# Patient Record
Sex: FEMALE | Race: WHITE | Employment: UNEMPLOYED | URBAN - METROPOLITAN AREA
[De-identification: names, ages, dates, MRNs, and addresses within clinical notes are randomized per-mention and may not be internally consistent; named-entity substitution may affect disease eponyms.]

---

## 2017-11-20 LAB
ANTIBODY SCREEN, EXTERNAL: NEGATIVE
CHLAMYDIA, EXTERNAL: NEGATIVE
HBSAG, EXTERNAL: NEGATIVE
HIV, EXTERNAL: NEGATIVE
N. GONORRHEA, EXTERNAL: NEGATIVE
RPR, EXTERNAL: NORMAL
RUBELLA, EXTERNAL: NORMAL
TYPE, ABO & RH, EXTERNAL: NORMAL

## 2018-05-29 LAB — GRBS, EXTERNAL: NEGATIVE

## 2018-07-10 ENCOUNTER — HOSPITAL ENCOUNTER (INPATIENT)
Age: 38
LOS: 1 days | Discharge: HOME | End: 2018-07-11
Attending: SPECIALIST | Admitting: OBSTETRICS & GYNECOLOGY
Payer: COMMERCIAL

## 2018-07-10 LAB
ABO + RH BLD: NORMAL
BASOPHILS # BLD: 0 K/UL (ref 0–0.06)
BASOPHILS NFR BLD: 0 % (ref 0–2)
BLOOD GROUP ANTIBODIES SERPL: NORMAL
DIFFERENTIAL METHOD BLD: ABNORMAL
EOSINOPHIL # BLD: 0.1 K/UL (ref 0–0.4)
EOSINOPHIL NFR BLD: 1 % (ref 0–5)
ERYTHROCYTE [DISTWIDTH] IN BLOOD BY AUTOMATED COUNT: 13.1 % (ref 11.6–14.5)
HCT VFR BLD AUTO: 30 % (ref 35–45)
HGB BLD-MCNC: 10 G/DL (ref 12–16)
LYMPHOCYTES # BLD: 1.2 K/UL (ref 0.9–3.6)
LYMPHOCYTES NFR BLD: 24 % (ref 21–52)
MCH RBC QN AUTO: 27.5 PG (ref 24–34)
MCHC RBC AUTO-ENTMCNC: 33.3 G/DL (ref 31–37)
MCV RBC AUTO: 82.6 FL (ref 74–97)
MONOCYTES # BLD: 0.4 K/UL (ref 0.05–1.2)
MONOCYTES NFR BLD: 8 % (ref 3–10)
NEUTS SEG # BLD: 3.5 K/UL (ref 1.8–8)
NEUTS SEG NFR BLD: 67 % (ref 40–73)
PLATELET # BLD AUTO: 169 K/UL (ref 135–420)
PMV BLD AUTO: 11.1 FL (ref 9.2–11.8)
RBC # BLD AUTO: 3.63 M/UL (ref 4.2–5.3)
SPECIMEN EXP DATE BLD: NORMAL
WBC # BLD AUTO: 5.2 K/UL (ref 4.6–13.2)

## 2018-07-10 PROCEDURE — 65270000029 HC RM PRIVATE

## 2018-07-10 PROCEDURE — 75410000002 HC LABOR FEE PER 1 HR

## 2018-07-10 PROCEDURE — 74011250637 HC RX REV CODE- 250/637: Performed by: ADVANCED PRACTICE MIDWIFE

## 2018-07-10 PROCEDURE — 74011250636 HC RX REV CODE- 250/636: Performed by: OBSTETRICS & GYNECOLOGY

## 2018-07-10 PROCEDURE — 75410000000 HC DELIVERY VAGINAL/SINGLE

## 2018-07-10 PROCEDURE — 74011250637 HC RX REV CODE- 250/637: Performed by: OBSTETRICS & GYNECOLOGY

## 2018-07-10 PROCEDURE — 75410000003 HC RECOV DEL/VAG/CSECN EA 0.5 HR

## 2018-07-10 PROCEDURE — 86900 BLOOD TYPING SEROLOGIC ABO: CPT | Performed by: OBSTETRICS & GYNECOLOGY

## 2018-07-10 PROCEDURE — 85025 COMPLETE CBC W/AUTO DIFF WBC: CPT | Performed by: OBSTETRICS & GYNECOLOGY

## 2018-07-10 RX ORDER — FAMOTIDINE 20 MG/1
20 TABLET, FILM COATED ORAL 2 TIMES DAILY
Status: DISCONTINUED | OUTPATIENT
Start: 2018-07-10 | End: 2018-07-11 | Stop reason: HOSPADM

## 2018-07-10 RX ORDER — RANITIDINE HCL 75 MG
75 TABLET ORAL 2 TIMES DAILY
COMMUNITY
End: 2018-07-11

## 2018-07-10 RX ORDER — LIDOCAINE HYDROCHLORIDE 10 MG/ML
20 INJECTION, SOLUTION EPIDURAL; INFILTRATION; INTRACAUDAL; PERINEURAL AS NEEDED
Status: DISCONTINUED | OUTPATIENT
Start: 2018-07-10 | End: 2018-07-10 | Stop reason: HOSPADM

## 2018-07-10 RX ORDER — OXYTOCIN/RINGER'S LACTATE 20/1000 ML
500 PLASTIC BAG, INJECTION (ML) INTRAVENOUS ONCE
Status: COMPLETED | OUTPATIENT
Start: 2018-07-10 | End: 2018-07-10

## 2018-07-10 RX ORDER — PROMETHAZINE HYDROCHLORIDE 25 MG/ML
25 INJECTION, SOLUTION INTRAMUSCULAR; INTRAVENOUS
Status: DISCONTINUED | OUTPATIENT
Start: 2018-07-10 | End: 2018-07-10 | Stop reason: SDUPTHER

## 2018-07-10 RX ORDER — CARBOPROST TROMETHAMINE 250 UG/ML
250 INJECTION, SOLUTION INTRAMUSCULAR
Status: DISCONTINUED | OUTPATIENT
Start: 2018-07-10 | End: 2018-07-10 | Stop reason: HOSPADM

## 2018-07-10 RX ORDER — SODIUM CHLORIDE, SODIUM LACTATE, POTASSIUM CHLORIDE, CALCIUM CHLORIDE 600; 310; 30; 20 MG/100ML; MG/100ML; MG/100ML; MG/100ML
125 INJECTION, SOLUTION INTRAVENOUS CONTINUOUS
Status: DISCONTINUED | OUTPATIENT
Start: 2018-07-10 | End: 2018-07-10 | Stop reason: HOSPADM

## 2018-07-10 RX ORDER — TERBUTALINE SULFATE 1 MG/ML
0.25 INJECTION SUBCUTANEOUS
Status: DISCONTINUED | OUTPATIENT
Start: 2018-07-10 | End: 2018-07-10 | Stop reason: HOSPADM

## 2018-07-10 RX ORDER — SALICYLIC ACID
POWDER (GRAM) MISCELLANEOUS
Status: DISPENSED
Start: 2018-07-10 | End: 2018-07-10

## 2018-07-10 RX ORDER — IBUPROFEN 400 MG/1
800 TABLET ORAL
Status: DISCONTINUED | OUTPATIENT
Start: 2018-07-10 | End: 2018-07-11 | Stop reason: HOSPADM

## 2018-07-10 RX ORDER — IBUPROFEN 400 MG/1
800 TABLET ORAL
Status: DISCONTINUED | OUTPATIENT
Start: 2018-07-10 | End: 2018-07-10 | Stop reason: SDUPTHER

## 2018-07-10 RX ORDER — LANOLIN ALCOHOL/MO/W.PET/CERES
400 CREAM (GRAM) TOPICAL DAILY
COMMUNITY

## 2018-07-10 RX ORDER — MISOPROSTOL 200 UG/1
800 TABLET ORAL
Status: DISCONTINUED | OUTPATIENT
Start: 2018-07-10 | End: 2018-07-10 | Stop reason: HOSPADM

## 2018-07-10 RX ORDER — ZOLPIDEM TARTRATE 5 MG/1
5 TABLET ORAL
Status: DISCONTINUED | OUTPATIENT
Start: 2018-07-10 | End: 2018-07-10 | Stop reason: SDUPTHER

## 2018-07-10 RX ORDER — ZOLPIDEM TARTRATE 5 MG/1
5 TABLET ORAL
Status: DISCONTINUED | OUTPATIENT
Start: 2018-07-10 | End: 2018-07-11 | Stop reason: HOSPADM

## 2018-07-10 RX ORDER — OXYTOCIN/RINGER'S LACTATE 20/1000 ML
125 PLASTIC BAG, INJECTION (ML) INTRAVENOUS CONTINUOUS
Status: DISCONTINUED | OUTPATIENT
Start: 2018-07-10 | End: 2018-07-10 | Stop reason: HOSPADM

## 2018-07-10 RX ORDER — AMOXICILLIN 250 MG
1 CAPSULE ORAL
Status: DISCONTINUED | OUTPATIENT
Start: 2018-07-10 | End: 2018-07-11 | Stop reason: HOSPADM

## 2018-07-10 RX ORDER — OXYTOCIN 10 [USP'U]/ML
10 INJECTION, SOLUTION INTRAMUSCULAR; INTRAVENOUS
Status: DISCONTINUED | OUTPATIENT
Start: 2018-07-10 | End: 2018-07-10 | Stop reason: HOSPADM

## 2018-07-10 RX ORDER — PROMETHAZINE HYDROCHLORIDE 25 MG/ML
25 INJECTION, SOLUTION INTRAMUSCULAR; INTRAVENOUS
Status: DISCONTINUED | OUTPATIENT
Start: 2018-07-10 | End: 2018-07-11 | Stop reason: HOSPADM

## 2018-07-10 RX ORDER — METHYLERGONOVINE MALEATE 0.2 MG/ML
0.2 INJECTION INTRAVENOUS AS NEEDED
Status: DISCONTINUED | OUTPATIENT
Start: 2018-07-10 | End: 2018-07-10 | Stop reason: HOSPADM

## 2018-07-10 RX ORDER — AMOXICILLIN 250 MG
1 CAPSULE ORAL
Status: DISCONTINUED | OUTPATIENT
Start: 2018-07-10 | End: 2018-07-10 | Stop reason: SDUPTHER

## 2018-07-10 RX ORDER — ONDANSETRON 2 MG/ML
4 INJECTION INTRAMUSCULAR; INTRAVENOUS
Status: DISCONTINUED | OUTPATIENT
Start: 2018-07-10 | End: 2018-07-10 | Stop reason: HOSPADM

## 2018-07-10 RX ADMIN — FAMOTIDINE 20 MG: 20 TABLET ORAL at 22:45

## 2018-07-10 RX ADMIN — Medication 10000 MILLI-UNITS/HR: at 09:17

## 2018-07-10 RX ADMIN — IBUPROFEN 800 MG: 400 TABLET ORAL at 10:24

## 2018-07-10 RX ADMIN — Medication 125 ML/HR: at 09:41

## 2018-07-10 RX ADMIN — IBUPROFEN 800 MG: 400 TABLET ORAL at 18:25

## 2018-07-10 NOTE — PROGRESS NOTES
5631 patient arrived to unit ambulatory with c/o ctxs since 0300 today. States about 5 mins apart. Placed on EFM, SVE 7/90/-1 with small amount of bloody show. Dr Brianna Reyes notified.  Admission process started

## 2018-07-10 NOTE — PROGRESS NOTES
Patient gave Carolinasunitha White permission to announce baby's name. Patient expressed gratitude for 's visit.     Spiritual Care Department   Intern  Zacarias Cardona  [phone]

## 2018-07-10 NOTE — PROGRESS NOTES
Mother doing well, up without complaints, voiding without problems. Pain managed well with ibuprofen. Bonding well with baby.

## 2018-07-10 NOTE — H&P
History & Physical    Name: Kamlesh Engle MRN: [id number]  SSN: [id number]    YOB: 1980  Age: 43 y.o. Sex: female        Subjective:     Estimated Date of Delivery: 18  OB History      Para Term  AB Living    1         SAB TAB Ectopic Molar Multiple Live Births                 Anais Dior is a 41yo  at 41.3 weeks in active labor. Prenatal care with Dr Kevan Martínez and uncomplicated pregnancy. AMA and had negative NIPT. Does not know gender of baby. GBS negative. History reviewed. No pertinent past medical history. History reviewed. No pertinent surgical history. Social History     Occupational History    Not on file. Social History Main Topics    Smoking status: Never Smoker    Smokeless tobacco: Never Used    Alcohol use No    Drug use: No    Sexual activity: Not on file     History reviewed. No pertinent family history. Allergies   Allergen Reactions    Percocet [Oxycodone-Acetaminophen] Other (comments)     Vomiting and make her feel like she's on fire     Prior to Admission medications    Medication Sig Start Date End Date Taking? Authorizing Provider   folic acid 165 mcg tablet Take 400 mcg by mouth daily. Yes Historical Provider   raNITIdine (ZANTAC 75) 75 mg tablet Take 75 mg by mouth two (2) times a day. Historical Provider        Review of Systems: A comprehensive review of systems was negative except for that written in the HPI. Objective:     Vitals: There were no vitals filed for this visit. Labs:  No results found for this or any previous visit (from the past 12 hour(s)). Physical Exam:  Heart: RRR  Lungs: Clear  Abdomen: gravid, nontender  Membranes:  Intact  Fetal Heart Rate: Reactive  Cervix 6-7/90/-1    Prenatal Labs:   No results found for: RUBELLAEXT, GRBSEXT, HBSAGEXT, HIVEXT, RPREXT, GONNOEXT, CHLAMEXT      Assessment/Plan:     IUP at 41.3 weeks  Active labor  GBS negative    Anticipate vaginal delivery.  Wants unmedicated birth.    Signed By:  Anderson Barton MD     July 10, 2018

## 2018-07-10 NOTE — PROGRESS NOTES
5553 Patient standing swaying at bedside at time of shift change. Patient denies needs or concerns at the time of change of shift. Patient requested that RN come back in a little while for her morning assessment and VS. FOB and  present and supportive. 9100 SBAR update given to Dr. Dimitri Otero at nurses' station regarding cervical dilation, history, and tracing reviewed on L&D board. Continue current plan of care. 0745 Patient RLR with peanut ball between knees. FOB and  very supportive. Patient and support system deny further concerns or complaints at this time. Ronn reports the CTX as fairly spaced out. TOCO adjusted and rezeroed. 0508 Patient waking in room. 2764 Patient called RN to bedside. Patient reported increased rectal pressure. Cervical exam performed. Cervix soft, posterior and 7-8/90/-1. Patient up to labor in the shower after BP was cycled. Patient and support system deny further concerns, needs or complaints at this time. 200 RN called to bedside. Patient reports a gush of fluid while in the shower. RN unable to visualize quantity and color since patient was showering. Will assess closer with next cervical exam.     9062 With cervical exam. RN could palpate membrane. 0848 SROM clear fluid. Patient grossly ruptured with CTX.     1120 Dr. Dimitri Otero paged    0035 SBAR to 121 Aakash Vilchis, Nursery RN, about patient status. 5446 At bedside with Dr. Dimitri Otero. 8277 Patient complete. Nursery called. Patient did not tolerate check well. Patient encouraged to push by physician. Patient states that she is not ready and that she does not know what to do. Physician left bedside. 0900 Patient frustrated and scared. RN offered support and coached patient with breathing and pushing. Pushing initiated. 0910 Fetal head visible with pushes. RN called out for Dr. Dimitri Otero to come. RN informed that the physician is no longer on the unit. RN asked for Dr. Dimitri Otero to be paged.     3731 RN Called for nursery to come to bedside. RN asked if another provider could be paged for delivery. Nurses at the desk assisting this RN. Patient unable to breathe through CTX and states that she has to push. Natalya Loza RN, from nursery here for transition. 235 [facility] [place], [facility], at bedside. Patient pushing well and coping well. 0504 Dr. Sharona Golden at bedside. 2213 Viable male infant delivered by Benedict Purdy, with Dr. Sharona Golden at bedside. Cord clamping delayed. Infant skin to skin post birth. Dr. Sharona Golden took over for the delivery of the placenta. Cass Guillen, left bedside following delivery. 3176 Livia care completed. 0930 Fundus firm and bleeding stable. 0945 Juice brought to bedside. Patient requested Motrin. 1 RN spoke with pharmacist about deleted duplicate orders. 1045 Moderate bleeding. Fundus dropped from +1 to -1 with massage and bleeding slowed to small following massage. Fundus firm. Bladder starting to fill. Created a plan with the patient to ambulate to the BR when RN is next in the room to attempt to void. 1100 RN at bedside. Fundus firm and bleeding stable. See MAR for timing of stopping IV fluid. IV flushed and locked. Patient up to BR to void. Patient walks with a smooth steady gait. Patient performed own livia care. Clean pads, panties, and ice pack on. Gown changed. 1115 Patient returned to bed. Patient going to feed on the opposite breast. Patient denies further concerns or complaints at this time. Patient reports her pain as a 3-4/10 at this time. Patient states this is an ok pain level for her at this time. 1150 Patient to 3410 and oriented to call bell and safety features in room. Patient denies further questions or concerns a time of transfer.

## 2018-07-10 NOTE — ROUTINE PROCESS
Bedside and Verbal shift change report given to MAHAD Qiu (oncoming nurse) by Gama Arellano RN (offgoing nurse). Report included the following information SBAR, Kardex, Intake/Output, MAR and Recent Results.

## 2018-07-10 NOTE — L&D DELIVERY NOTE
Delivery Summary    Patient: Carmen Hartmann MRN: [id number]  SSN: [id number]    YOB: 1980  Age: 43 y.o.   Sex: female        Labor Events:    Labor: No    Rupture Date: 7/10/2018    Rupture Time: 8:58 AM    Rupture Type SROM    Amniotic Fluid Volume:      Amniotic Fluid Description: Clear  None    Induction:          Augmentation: None    Labor Complications: None     Additional Complications:        Cervical Ripening:       None      Delivery Events:  Episiotomy: None    Laceration(s): None      Repaired: None     Number of Repair Packets:      Suture Type and Size:         Estimated Blood Loss (ml): 200        Information for the patient's :  Ziyad Prater [[id number]]     Delivery Summary - Baby    Delivery Date: 7/10/2018   Delivery Time: 9:17 AM   Delivery Type: Vaginal, Spontaneous Delivery  Sex:  male  Gestational Age: 45w2d  Delivery Clinician:  Hong Michael  Living?: Living   Delivery Location: L&D             APGARS  One minute Five minutes Ten minutes   Skin Color: 0    1       Heart Rate: 2   2         Reflex Irritability: 2   2         Muscle Tone: 2   2       Respiration: 2   2         Total: 8   9           Presentation: Vertex  Position: Right Occiput Anterior  Resuscitation Method:  Tactile Stimulation     Meconium Stained: None    Cord Information:     Complications: None  Cord Blood Sent?:  Yes    Blood Gases Sent?:  No    Placenta:  Date/Time: 7/10  9:21 AM  Removal: Spontaneous      Appearance: Normal      Measurements:  Birth Weight: 4.04 kg    Birth Length: 54.6 cm   Head Circumference: 36 cm     Chest Circumference: 36 cm    Abdominal Girth: 34 cm    Other Providers:   IVY SANCHEZ;RADHA BERNAL LISA D;;;;;;Jamilah GARCIA Obstetrician;Primary Nurse;Primary  Nurse;Nicu Nurse;Neonatologist;Anesthesiologist;Crna;Nurse Practitioner;Midwife;Nursery Nurse           Cord Blood Results:  Information for the patient's : Tracy BOYD [[id number]]   No results found for: Giovany Skains, PCTDIG, BILI, ABORHEXT, [address]    Information for the patient's :  Tracy BOYD [[id number]]   No results found for: APH, APCO2, APO2, AHCO3, ABEC, ABDC, O2ST, SITE, RSCOM, PHI, [place], PO2I, HCO3I, SO2I, IBD     Information for the patient's :  Tracy BOYD [[id number]]   No results found for: EPHV, PCO2V, PO2V, HCO3V, O2STV, EBDV

## 2018-07-10 NOTE — PROGRESS NOTES
Labor progress note:       Here to evaluate labor progress. Estimated Gestational Age: 45w2d     Baseline FHR: Patient Vitals for the past 2 hrs:    Mode Fetal Heart Rate Fetal Activity Variability Decelerations Accelerations RN Reviewed Strip?   07/10/18 0800 External 125 Present 6-25 BPM None Yes Yes   07/10/18 0730 External 135 Present 6-25 BPM None Yes Yes   07/10/18 0710 External 130 - 6-25 BPM None Yes Yes        Previous pelvic exam:      Cervical Exam  Dilation (cm): 10  Eff: 100 %  Station: +1  Position: Anterior  Cervical Consistency: Soft  Vaginal exam done by? : Dr. Denise Leon Status: SROM  Dilation Complete Date: 07/10/18  Dilation Time Complete: 858     Membranes  Membrane Status: SROM  Rupture Identifier: Rupture 1  Rupture Date: 07/10/18  Rupture Time: 848  Odor: None  Amniotic Fluid Description: Clear     Visit Vitals    /88    Pulse 80    Temp 97.8 °F (36.6 °C)    Resp 18    Ht 5' 7\" (1.702 m)    Wt 84.8 kg (187 lb)    Breastfeeding No    BMI 29.29 kg/m2       Temp (24hrs), Av.9 °F (36.6 °C), Min:97.8 °F (36.6 °C), Max:98 °F (36.7 °C)      WBC   Date/Time Value Ref Range Status   07/10/2018 05:58 AM 5.2 4.6 - 13.2 K/uL Final   2010 12:18 PM 4.5 4.5 - 13.0 K/uL Final     HGB   Date/Time Value Ref Range Status   07/10/2018 05:58 AM 10.0 (L) 12.0 - 16.0 g/dL Final   2010 12:18 PM 14.0 12.0 - 16.0 g/dL Final     PLATELET   Date/Time Value Ref Range Status   07/10/2018 05:58  135 - 420 K/uL Final        Plan: continue current care and close monitoring             Josh Braxton MD

## 2018-07-10 NOTE — ROUTINE PROCESS
TRANSFER - IN REPORT:    Verbal report received from Dang RN (name) on Enoch Brownts  being received from Labor and Delivery (unit) for routine progression of care      Report consisted of patients Situation, Background, Assessment and   Recommendations(SBAR). Information from the following report(s) SBAR, Kardex and Intake/Output was reviewed with the receiving nurse. Opportunity for questions and clarification was provided. Assessment completed upon patients arrival to unit and care assumed.

## 2018-07-10 NOTE — IP AVS SNAPSHOT
[address] Patient: Mel Shoemaker MRN: [id number] CIB:[id number] About your hospitalization You were admitted on:  July 10, 2018 You last received care in the:  [place] [address] You were discharged on:  July 11, 2018 Why you were hospitalized Your primary diagnosis was:  Not on File Your diagnoses also included:  Normal Labor And Delivery Follow-up Information Follow up With Details Comments Contact Info Remberto Oliveira, 200 [facility] [address] [place] [address] 
[phone] Discharge Orders None A check kiersten indicates which time of day the medication should be taken. My Medications START taking these medications Instructions Each Dose to Equal  
 Morning Noon Evening Bedtime  
 ibuprofen 800 mg tablet Commonly known as:  MOTRIN Your last dose was: Your next dose is: Take 1 Tab by mouth every eight (8) hours as needed. 800 mg CONTINUE taking these medications Instructions Each Dose to Equal  
 Morning Noon Evening Bedtime  
 folic acid 823 mcg tablet Your last dose was: Your next dose is: Take 400 mcg by mouth daily. 400 mcg STOP taking these medications ZANTAC 75 75 mg tablet Generic drug:  raNITIdine Where to Get Your Medications Information on where to get these meds will be given to you by the nurse or doctor. ! Ask your nurse or doctor about these medications  
  ibuprofen 800 mg tablet Discharge Instructions CONGRATULATIONS ON THE BIRTH OF YOUR BABY! The first six weeks after childbirth is a time of physical and emotional adjustment.   This handout will help to answer questions and provide guidance during the postpartum period. Every family's adjustment is unique, so please call if you have further concerns. At anytime we can be reached at [phone]. During office hours please ask to speak to a charge nurse. After hours, the answering service will take a message and the Nurse-Midwife on-call will return your call. If your question can wait until office hours: Monday-Friday 8:30-4:00, please do so. For emergencies or urgent concerns do not hesitate to call us after hours. DIET Your body is in need of a well-balanced, high protein diet to recuperate from birth. Please continue to take your prenatal vitamins for 6 weeks or as long as you are breastfeeding. Continue to drink at least 6-8 cups of water or other liquid a day. A breastfeeding mother also needs extra protein, calories and calcium containing foods. It is a good rule to drink fluids with every feeding in order to maintain an adequate milk supply and avoid dehydration. Your baby will probably not be bothered by things in your diet, but if the baby seems extremely fussy or develops a rash, you may want to discuss possible food intolerances with your baby's care provider. PAIN MEDICATIONS Acetaminophen (Tylenol), ibuprofen (Motrin), or other prescribed pain medication may be taken as directed to relieve discomfort. The above medications pass in very minimal amounts into the breast milk and usually will not cause problems. There are medications that may affect the baby, so please consult your baby's care provider before taking medication. If you are breastfeeding, be sure to mention this to any care provider you see so that medications that are safe may be selected. There is an excellent resource called AL that is a resource for medication safety in pregnancy and lactation. You can visit their website at [employer]. org/ or call them toll free at [phone] if you have any questions about medication safety. UTERINE INVOLUTION / VAGINAL BLEEDING Involution is the process of the uterus returning to pre-pregnant size. It will take approximately six weeks for this process to occur. To achieve this size your uterus becomes firm to slow bleeding loss from the placental site. The first 7 days after birth, the bleeding is red and heavy. It may change with your activity and position. Some small clots are normal.   After ten days, the bleeding should be pale pink and slowed considerably. The next several weeks may progress to a pink, mucousy discharge. This may continue for 6-8 weeks, depending on your activity. During the first four weeks after delivery we recommend using sanitary pads instead of tampons. Douching should also be avoided, but it is fine to take a tub bath so long as the tub is very clean. ACTIVITY/EXERCISE Adequate rest is essential to recovery. Try to rest or sleep when the baby sleeps. After two weeks, you may begin going for short walks, doing Kegel exercises and abdominal crunches. Avoid heavy, jarring or aerobic exercises. Remember to start out slowly and build up to your previous fitness level. Use common sense and don't overdo as rest is important and the benefits of increased rest are a quicker recovery. For the first two weeks after a  try to limit trips up or down steps. Do not lift anything heavier than the baby during this time. Lifting the baby or other objects should be done by bending at the knees rather than the waist.  Driving should be avoided during the first two to three weeks until you have the strength to push firmly on the brakes in case of an emergency. You may ride as a passenger, but DO wear a seat belt at all times. After a few weeks, you may resume normal activity at whatever pace is comfortable for you. Exercise may also be resumed gradually.   Walking is a good way to start. Finally, try to be reasonable in your expectations. Caring for a new baby after major surgery can be quite trying. Arrange for assistance at home to ensure that you get enough rest.  
 
POSTPARTUM CHECK You may call the office when you return home to set up a postpartum visit. Most patients will be seen at 6 weeks after delivery, but after a  or other circumstances you may be seen in 2 weeks or less. If you are discharged from the hospital with staples that must be removed, you will be asked to come in sooner. At your postpartum visit, a pelvic exam may be performed. If you are having any problems or concerns, please do not hesitate to call. Once again our number is [phone]. MOOD CHANGES Significant hormonal changes occur in the days following delivery, and as a result, many women experience brief episodes of tearfulness or feeling \"blue. \"  These emotional swings may be made worse by lack of sleep and by the adjustments inherent in becoming a mother. For some women, these fluctuations are minor. For others, they are overwhelming; creating feelings of anxiety, depression, or the inability to cope. If you have difficulty functioning as a result of feeling down, or if the mood changes seem severe, do not improve, or result is thoughts of harming yourself or others CALL RIGHT AWAY. PERINEAL CARE The basic goals of perineal care are to prevent infection, to relieve pain and promote healing. Your stitches will dissolve in four to six weeks, and do not need to be removed. After urinating, please continue to clean with warm water from front to back. Please continue sitz baths as instructed twice a day for a week or as needed. Call the office if you see pus in the suture site, or have unusual or severe swelling or pain that seems to be getting worse. INCISION CARE If you had a , clean and dry the incision gently as you would the rest of your body. Washing over the area with soap and water, and showering are fine. If steri-strips are present they will gradually come off with time. Tub baths are permitted. You may experience numbness and burning in the area surrounding the incision which usually resolves gradually over the next several weeks or months. RETURN OF MENSTRUATION Your first menstrual period may occur as soon as four to six weeks after your delivery if you are not breast-feeding. If breast-feeding it is more difficult to predict when your first period will occur. Even if you are not yet menstruating, you may be ovulating and it may be possible to conceive again. It is common for your first period after childbirth to be very heavy with an increased amount of cramping. BREASTS Breast-feeding Mothers: Colostrum is excreted in the first 24-72 hours. Mature breast milk will appear on the 2nd to 5th day. Engorgement may occur with the mature milk making your breasts feel warm and very full. Frequent feedings will make you more comfortable. Babies do not nurse on regular schedules. Nursing every 1 1/2 to 2 hours is normal and frequent feeding DOES NOT mean you are not making enough milk. To avoid nipple confusion, do not give bottles for the first 4 weeks. Growth spurts are common and may require more frequent feedings. This is the way baby increases your milk supply. During a growth spurt, you may feel you are feeding very frequently and that your breasts are \"empty. \"  Don't worry, your milk is produced by supply and demand so this increased frequency of feeding will increase your milk supply within 48 hours. Sore nipples may occur with frequent feedings and are sometimes also caused by improper latch. Check for a proper latch. Baby should have a wide open mouth. Use different positions at each feeding if possible.   Express a small amount of colostrum or breast milk onto the sore area and leave bra flaps unlatched until dry. The lactation consultant at [facility] is available for outpatient consultation without charge. Call [phone] from Monday-Friday 9:00am- 3:00pm to arrange an outpatient appointment with her. Local [facility] and consultants may also be very helpful. If You Are Not Breast-feeding: You will experience swelling, engorgement and some milk production. There are no safe medications available to stop lactation. Some remedies for engorgement include: wearing a tight bra, ice packs and cold green cabbage leaves placed between the breast and your bra. Change these frequently. Tylenol or Motrin should help with the discomfort. SEXUAL ADJUSTMENTS We recommend that you wait at least four weeks before resuming sexual intercourse. A sore perineum, a demanding baby and fatigue will certainly affect your ability to enjoy lovemaking! A vaginal lubricant is recommended to help with any dryness. It is very important to remember that you will ovulate BEFORE your first period and can conceive. If you do not wish another pregnancy right away, please take precautions to avoid pregnancy. If you would like a prescription method of birth control, please discuss this with us at your 6 week visit. ELIMINATION We remind all postpartum patients that it may take a few days for your bowels to return to normal, especially if you had a long labor. For those who had C-sections or severe lacerations, we recommend that you use a stool softener twice daily for at least two weeks. Many stool softeners are over-the-counter. Colace (Docusate Sodium) is recommended. Bulk forming agents such as Metamucil or Fibercon may be used daily in addition to a stool softener to promote regular bowel movements. Eating fresh fruits and vegetables along with whole grains is helpful as well.   Do not be afraid to have a bowel movement as your stitches will not \"come out\" in the course of having a bowel movement. Urination may be difficult due to soreness around the urethra, or as an after effect of epidural.  This is temporary and can be helped  by squirting water over the perineum or try going in the shower. Hemorrhoids are common after birth. Tucks pads, Anusol cream and avoiding constipation are helpful. If constipation does occur, you may take Milk of Magnesia or Senekot according to the package instructions. DANGER SIGNS! CALL WITHOUT DELAY IF YOU ARE EXPERIENCING ANY OF THE FOLLOWING: 
* Unusually heavy bleeding, soaking more than 1 or more pads in an hour. * Vaginal discharge with strong foul odor. * Fever of 101 or higher * Unusual pain or tenderness in the abdominal area. * If breasts are red, hot or have a painful lump. * Depression that persists longer than 1-2 weeks or is severe. * Any urinary frequency accompanied by urgency or pain. * A lump in leg or calf especially if painful, warm or red. We thank you for choosing us for your prenatal care and/or delivery. We wish you all happiness and health with your baby for his or her lifetime! Usha Samuels CNM Patient armband removed and given to patient to take home. Patient was informed of the privacy risks if armband lost or stolen Introducing [facility]! [facility] introduces [employer] patient portal. Now you can access parts of your medical record, email your doctor's office, and request medication refills online. 1. In your internet browser, go to [email or web address] 2. Click on the First Time User? Click Here link in the Sign In box. You will see the New Member Sign Up page. 3. Enter your [employer] Access Code exactly as it appears below. You will not need to use this code after youve completed the sign-up process. If you do not sign up before the expiration date, you must request a new code.  
 
· [employer] Access Code: [email or web address] 
 Expires: 10/9/2018 12:41 PM 
 
4. Enter the last four digits of your Social Security Number (xxxx) and Date of Birth (mm/dd/yyyy) as indicated and click Submit. You will be taken to the next sign-up page. 5. Create a Diamond Mindt ID. This will be your [employer] login ID and cannot be changed, so think of one that is secure and easy to remember. 6. Create a [employer] password. You can change your password at any time. 7. Enter your Password Reset Question and Answer. This can be used at a later time if you forget your password. 8. Enter your e-mail address. You will receive e-mail notification when new information is available in [address] 9. Click Sign Up. You can now view and download portions of your medical record. 10. Click the Download Summary menu link to download a portable copy of your medical information. If you have questions, please visit the Frequently Asked Questions section of the [employer] website. Remember, [employer] is NOT to be used for urgent needs. For medical emergencies, dial 911. Now available from your iPhone and Android! Introducing Lee Holland As a Lydia Martin patient, I wanted to make you aware of our electronic visit tool called Lee Holland. [facility] 24/7 allows you to connect within minutes with a medical provider 24 hours a day, seven days a week via a mobile device or tablet or logging into a secure website from your computer. You can access Lee Holland from anywhere in the [place]. A virtual visit might be right for you when you have a simple condition and feel like you just dont want to get out of bed, or cant get away from work for an appointment, when your regular Lydia Martin provider is not available (evenings, weekends or holidays), or when youre out of town and need minor care.   Electronic visits cost only $49 and if the Lee Holland provider determines a prescription is needed to treat your condition, one can be electronically transmitted to a nearby pharmacy*. Please take a moment to enroll today if you have not already done so. The enrollment process is free and takes just a few minutes. To enroll, please download the [employer] 24/7 demar to your tablet or phone, or visit [email or web address] to enroll on your computer. And, as an [address] [place] [address] patient with a [employer] account, the results of your visits will be scanned into your electronic medical record and your primary care provider will be able to view the scanned results. We urge you to continue to see your regular [employer] provider for your ongoing medical care. And while your primary care provider may not be the one available when you seek a Baileyu virtual visit, the peace of mind you get from getting a real diagnosis real time can be priceless. For more information on Bailey[employer], view our Frequently Asked Questions (FAQs) at [email or web address]. Sincerely, 
 
Rossi Campbell MD 
Chief Medical Officer [address] [place] [address] *:  certain medications cannot be prescribed via Baileyu Providers Seen During Your Hospitalization Provider Specialty Primary office phone Marianela Valle MD Obstetrics & Gynecology [phone] Your Primary Care Physician (PCP) Primary Care Physician Office Phone Office Fax [facility] [place], [address] [place] [address] [phone] You are allergic to the following Allergen Reactions Percocet (Oxycodone-Acetaminophen) Other (comments) Vomiting and make her feel like she's on fire Recent Documentation Height Weight Breastfeeding? BMI OB Status Smoking Status 1.702 m 84.8 kg Unknown 29.29 kg/m2 Recent pregnancy Never Smoker Emergency Contacts Name Discharge Info Relation Home Work Mobile Phillip Fuentes DISCHARGE CAREGIVER [3] Spouse [3] [phone] Patient Belongings The following personal items are in your possession at time of discharge: 
  Dental Appliances: None  Visual Aid: None      Home Medications: None   Jewelry: Earrings  Clothing: Shorts, Shirt, Footwear, At bedside, Undergarments, With patient    Other Valuables: Cell Phone Discharge Instructions Attachments/References DEPRESSION: POSTPARTUM (ENGLISH) BREASTFEEDING (ENGLISH) Patient Handouts Depression After Childbirth: Care Instructions Your Care Instructions Many women get the \"baby blues\" during the first few days after childbirth. You may lose sleep, feel irritable, and cry easily. You may feel happy one minute and sad the next. Hormone changes are one cause of these emotional changes. Also, the demands of a new baby, along with visits from relatives or other family needs, add to a mother's stress. The \"baby blues\" often peak around the fourth day. Then they ease up in less than 2 weeks. If your moodiness or anxiety lasts for more than 2 weeks, or if you feel like life is not worth living, you may have postpartum depression. This is different for each mother. Some mothers with serious depression may worry intensely about their infant's well-being. Others may feel distant from their child. Some mothers might even feel that they might harm their baby. A mother may have signs of paranoia, wondering if someone is watching her. Depression is not a sign of weakness. It is a medical condition that requires treatment. Medicine and counseling often work well to reduce depression. Talk to your doctor about taking antidepressant medicine while breastfeeding. Follow-up care is a key part of your treatment and safety. Be sure to make and go to all appointments, and call your doctor if you are having problems. It's also a good idea to know your test results and keep a list of the medicines you take. How do you know if you are depressed? With all the changes in your life, you may not know if you are depressed. Pregnancy sometimes causes changes in how you feel that are similar to the symptoms of depression. Symptoms of depression include: · Feeling sad or hopeless and losing interest in daily activities. These are the most common symptoms of depression. · Sleeping too much or not enough. · Feeling tired. You may feel as if you have no energy. · Eating too much or too little. · Writing or talking about death, such as writing suicide notes or talking about guns, knives, or pills. Keep the numbers for these national suicide hotlines: 2-124-999-TALK (3-985.961.2697) and 4-228-JCZQDNY (4-230.740.2035). If you or someone you know talks about suicide or feeling hopeless, get help right away. How can you care for yourself at home? · Be safe with medicines. Take your medicines exactly as prescribed. Call your doctor if you think you are having a problem with your medicine. · Eat a healthy diet so that you can keep up your energy. · Get regular daily exercise, such as walks, to help improve your mood. · Get as much sunlight as possible. Keep your shades and curtains open. Get outside as much as you can. · Avoid using alcohol or other substances to feel better. · Get as much rest and sleep as possible. Avoid doing too much. Being too tired can increase depression. · Play stimulating music throughout your day and soothing music at night. · Schedule outings and visits with friends and family. Ask them to call you regularly, so that you do not feel alone. · Ask for help with preparing food and other daily tasks. Family and friends are often happy to help a mother with a . · Be honest with yourself and those who care about you. Tell them about your struggle. · Join a support group of new mothers. No one can better understand the challenges of caring for a  than other new mothers. · If you feel like life is not worth living or are feeling hopeless, get help right away. Keep the numbers for these national suicide hotlines: 8-123-098-TALK (6-442.407.2365) and 6-789-XOEMJJL (1-916.359.5597). When should you call for help? Call 911 anytime you think you may need emergency care. For example, call if: 
  · You feel you cannot stop from hurting yourself, your baby, or someone else.  
[facility] your doctor now or seek immediate medical care if: 
  · You are having trouble caring for yourself or your baby.  
  · You hear voices.  
Michael Jorge closely for changes in your health, and be sure to contact your doctor if: 
  · You have problems with your depression medicine.  
  · You do not get better as expected. Where can you learn more? Go to http://armando-rosalina.info/. Enter D694 in the search box to learn more about \"Depression After Childbirth: Care Instructions. \" Current as of: December 7, 2017 Content Version: 11.7 © 9213-5634 [employer]. Care instructions adapted under license by [employer] (which disclaims liability or warranty for this information). If you have questions about a medical condition or this instruction, always ask your healthcare professional. [address] any warranty or liability for your use of this information. Breastfeeding: Care Instructions Your Care Instructions Breastfeeding has many benefits. It may lower your baby's chances of getting an infection. It also may prevent your baby from having problems such as diabetes and high cholesterol later in life. Breastfeeding also helps you bond with your baby. The American Academy of Pediatrics recommends breastfeeding for at least a year. That may be very hard for many women to do, but breastfeeding even for a shorter period of time is a health benefit to you and your baby.  In the first days after birth, your breasts make a thick, yellow liquid called colostrum. This liquid gives your baby nutrients and antibodies against infection. It is all that babies need in the first days after birth. Your breasts will fill with milk a few days after the birth. Breastfeeding is a skill that gets better with practice. It is common to have some problems. Some women have sore or cracked nipples, blocked milk ducts, or a breast infection (mastitis). You can treat these problems if they happen and continue breastfeeding. Follow-up care is a key part of your treatment and safety. Be sure to make and go to all appointments, and call your doctor if you are having problems. It's also a good idea to know your test results and keep a list of the medicines you take. How can you care for yourself at home? · Breastfeed your baby whenever he or she is hungry. In the first 2 weeks, your baby will feed about every 1 to 3 hours. This will help you keep up your supply of milk. · Put a bed pillow or a nursing pillow on your lap to support your arms and your baby. · Hold your baby in a comfortable position. ¨ You can hold your baby in several ways. One of the most common positions is the cradle hold. One arm supports your baby, with his or her head in the bend of your elbow. Your open hand supports your baby's bottom or back. Your baby's belly lies against yours. ¨ If you had your baby by , or , try the football hold. This position keeps your baby off your belly. Tuck your baby under your arm, with his or her body along the side you will be feeding on. Support your baby's upper body with your arm. With that hand you can control your baby's head to bring his or her mouth to your breast. 
¨ Try different positions with each feeding. If you are having problems, ask for help from your doctor or a lactation consultant.  
· To get your baby to latch on: 
¨ Support and narrow your breast with one hand using a \"U hold,\" with your thumb on the outer side of your breast and your fingers on the inner side. You can also use a \"C hold,\" with all your fingers below the nipple and your thumb above it. Try the different holds to get the deepest latch for whichever breastfeeding position you use. Your other arm is behind your baby's back, with your hand supporting the base of the baby's head. Position your fingers and thumb to point toward your baby's ears. ¨ You can touch your baby's lower lip with your nipple to get your baby to open his or her mouth. Wait until your baby opens up really wide, like a big yawn. Then be sure to bring the baby quickly to your breast-not your breast to the baby. As you bring your baby toward your breast, use your other hand to support the breast and guide it into his or her mouth. ¨ Both the nipple and a large portion of the darker area around the nipple (areola) should be in the baby's mouth. The baby's lips should be flared outward, not folded in (inverted). ¨ Listen for a regular sucking and swallowing pattern while the baby is feeding. If you cannot see or hear a swallowing pattern, watch the baby's ears, which will wiggle slightly when the baby swallows. If the baby's nose appears to be blocked by your breast, tilt the baby's head back slightly, so just the edge of one nostril is clear for breathing. ¨ When your baby is latched, you can usually remove your hand from supporting your breast and bring it under your baby to cradle him or her. Now just relax and breastfeed your baby. · You will know that your baby is feeding well when: 
¨ His or her mouth covers a lot of the areola, and the lips are flared out. ¨ His or her chin and nose rest against your breast. 
¨ Sucking is deep and rhythmic, with short pauses. ¨ You are able to see and hear your baby swallowing. ¨ You do not feel pain in your nipple.  
· If your baby takes only one breast at a feeding, start the next feeding on the other breast. 
 · Anytime you need to remove your baby from the breast, put one finger in the corner of his or her mouth. Push your finger between your baby's gums to gently break the seal. If you do not break the tight seal before you remove your baby, your nipples can become sore, cracked, or bruised. · After feeding your baby, gently pat his or her back to let out any swallowed air. After your baby burps, offer the breast again, or offer the other breast. Sometimes a baby will want to keep feeding after being burped. When should you call for help? Call your doctor now or seek immediate medical care if: 
  · You have symptoms of a breast infection, such as: 
¨ Increased pain, swelling, redness, or warmth around a breast. 
¨ Red streaks extending from the breast. 
¨ Pus draining from a breast. 
¨ A fever.  
  · Your baby has no wet diapers for 6 hours.  
 Watch closely for changes in your health, and be sure to contact your doctor if: 
  · Your baby has trouble latching on to your breast.  
  · You continue to have pain or discomfort when breastfeeding.  
  · You have other questions or concerns. Where can you learn more? Go to http://armando-rosalina.info/. Enter P492 in the search box to learn more about \"Breastfeeding: Care Instructions. \" Current as of: November 21, 2017 Content Version: 11.7 © 3040-6787 [employer]. Care instructions adapted under license by [employer] (which disclaims liability or warranty for this information). If you have questions about a medical condition or this instruction, always ask your healthcare professional. [place] [address] any warranty or liability for your use of this information. Please provide this summary of care documentation to your next provider. Signatures-by signing, you are acknowledging that this After Visit Summary has been reviewed with you and you have received a copy. Patient Signature:  ____________________________________________________________ Date:  ____________________________________________________________  
  
Jessica Shelbie Provider Signature:  ____________________________________________________________ Date:  ____________________________________________________________

## 2018-07-10 NOTE — PROGRESS NOTES
Bedside and Verbal shift change report given to FRANK Sutherland RN, (oncoming nurse) by Farzad Crockett RN, (offgoing nurse). Report included the following information SBAR, Kardex, Procedure Summary, Intake/Output, MAR and Recent Results.     Hui Mcbride RN

## 2018-07-10 NOTE — PROGRESS NOTES
TRANSFER - OUT REPORT:    Verbal report received from FRANK Sutherland RN, (name) on Jefferson Boothe  being received from Jasmina Mitchell RN, (unit) for routine progression of care      Report consisted of patients Situation, Background, Assessment and   Recommendations(SBAR). Information from the following report(s)  was reviewed with the receiving nurse. Opportunity for questions and clarification was provided.       Alejandra Shea RN

## 2018-07-10 NOTE — PROGRESS NOTES
1918 - Bedside shift report w/ Deshawn Rodarte RN. Pt in bed resting. FOB at bedside. Infant in bassinet. Introduced self. No other needs or concerns. Call light w/in reach. 2100 - Pt in bed sleeping. FOB at bedside sleeping. Infant in bassinet. Pt easily awoken and assessment completed. VSS. Pt c/o 1/10 pain in abdomen, but declines any pain intervention at this time. Discussed plan for shift, hourly rounding, pain reporting/management, normal vs abnormal findings, tests/procedures ordered, when to call nurse and pt safety. Questions answered. No other concerns at this time. Call light w/in reach. ID bands matched and infant taken to nursery by this nurse. 2220 - Pt sitting in chair. FOB at bedside. Infant returned to room and ID bands matched. Pt c/o heartburn and requesting medication intervention. No other needs or concerns at this time. 2230 - Spoke to Lane Grady CNM re pt c/o heartburn. Orders given. 2245 - Pepcid given to pt for heartburn. 2320 - Pt states slight decrease in heartburn. Sitting up in chair at the moment because \"it helps. \" No other needs or concerns at this time. Call light w/in reach. 07/11/2018    0400 - Pt sitting in chair at bedside resting. FOB in bed sleeping. Infant in bassinet. Assessment completed. VSS. Pt c/o 2/10 pain that alternates between cramping and heartburn, but declines any pain intervention at this time. No other needs or concerns. Call light w/in reach.

## 2018-07-11 VITALS
BODY MASS INDEX: 29.35 KG/M2 | DIASTOLIC BLOOD PRESSURE: 65 MMHG | WEIGHT: 187 LBS | HEART RATE: 81 BPM | OXYGEN SATURATION: 98 % | HEIGHT: 67 IN | SYSTOLIC BLOOD PRESSURE: 110 MMHG | TEMPERATURE: 97.8 F | RESPIRATION RATE: 16 BRPM

## 2018-07-11 LAB
HCT VFR BLD AUTO: 27 % (ref 35–45)
HGB BLD-MCNC: 8.5 G/DL (ref 12–16)

## 2018-07-11 PROCEDURE — 85018 HEMOGLOBIN: CPT | Performed by: OBSTETRICS & GYNECOLOGY

## 2018-07-11 PROCEDURE — 74011250637 HC RX REV CODE- 250/637: Performed by: ADVANCED PRACTICE MIDWIFE

## 2018-07-11 PROCEDURE — 85014 HEMATOCRIT: CPT | Performed by: OBSTETRICS & GYNECOLOGY

## 2018-07-11 PROCEDURE — 74011250637 HC RX REV CODE- 250/637: Performed by: OBSTETRICS & GYNECOLOGY

## 2018-07-11 PROCEDURE — 36415 COLL VENOUS BLD VENIPUNCTURE: CPT | Performed by: OBSTETRICS & GYNECOLOGY

## 2018-07-11 RX ORDER — IBUPROFEN 800 MG/1
800 TABLET ORAL
Qty: 60 TAB | Refills: 0 | Status: SHIPPED | OUTPATIENT
Start: 2018-07-11

## 2018-07-11 RX ADMIN — FAMOTIDINE 20 MG: 20 TABLET ORAL at 10:04

## 2018-07-11 RX ADMIN — IBUPROFEN 800 MG: 400 TABLET ORAL at 07:45

## 2018-07-11 NOTE — DISCHARGE SUMMARY
Obstetrical Discharge Summary       [facility]  [address] [facility]  [phone]        Patient ID:Mikel [id number],72 y.o.,1980    Postpartum Day: Information for the patient's :  Patriciajuan BOYD [[id number]]   2 days       Admit Date: 7/10/2018    Discharge Date: 2018     Admitting Physician: Branden Valencia MD     Admission Diagnoses: maternity  Normal labor and delivery    Discharge Diagnoses: same as above      Additional Diagnoses:Present on Admission:  **None**       Patient Active Problem List   Diagnosis Code    Normal labor and delivery O80         Procedure:        Baby link  Information for the patient's :  Patricia BOYD [[id number]]     Delivery Type: Vaginal, Spontaneous Delivery   Delivery Date: 7/10/2018   Delivery Time: 9:17 AM     Birth Weight: 4.04 kg     Sex:  male  Delivery Clinician:  Ben Kidd   Gestational Age: Gestational Age: 45w2d    Presentation: Vertex   Position: Right  Occiput  Anterior     Apgars were 8  and 9      Resuscitation Method: Tactile Stimulation     Meconium Stained: None  Living Status: Living       Placenta Date/Time: 7/10  9:21 AM   Placenta Removal: Spontaneous   Placenta Appearance: Vertex [1]     Cord Information:      Cord Events: None       Cord Blood Sent?:  Yes    Blood Gases Sent?:  No         Baby procedures:    Information for the patient's :  Patricia BOYD [[id number]]          Feeding Method: Infant Feeding: Breastmilk    Immunizations: There is no immunization history for the selected administration types on file for this patient. Immunizations: There is no immunization history for the selected administration types on file for this patient.     Group Beta Strep: GrBStrep, External   Date Value Ref Range Status   2018 Negative  Final          WBC   Date/Time Value Ref Range Status   07/10/2018 05:58 AM 5.2 4.6 - 13.2 K/uL Final   2010 12:18 PM 4.5 4.5 - 13.0 K/uL Final     HGB   Date/Time Value Ref Range Status   2018 06:22 AM 8.5 (L) 12.0 - 16.0 g/dL Final   07/10/2018 05:58 AM 10.0 (L) 12.0 - 16.0 g/dL Final   2010 12:18 PM 14.0 12.0 - 16.0 g/dL Final     PLATELET   Date/Time Value Ref Range Status   07/10/2018 05:58  135 - 420 K/uL Final         Hospital Course: Unremarkable  Subjective:         Cesar Oats is requesting discharge. Intact perineum , no problems with breastfeeding. Will FU with Dr Tian Ayala. Advised Floradix for iron supplementation since is dairy and beef intolerant. Objective:   Breasts Nontender and intact  Fundus firm and involuting  Lochia rubra, minimal  Legs minimal to no edema and without pain. Lab/Data Review:  Patient Vitals for the past 8 hrs:   BP Temp Pulse Resp SpO2   18 0400 110/70 97.8 °F (36.6 °C) 72 16 98 %       Temp (24hrs), Av.9 °F (36.6 °C), Min:97.6 °F (36.4 °C), Max:98.2 °F (36.8 °C)      WBC   Date/Time Value Ref Range Status   07/10/2018 05:58 AM 5.2 4.6 - 13.2 K/uL Final   2010 12:18 PM 4.5 4.5 - 13.0 K/uL Final     HGB   Date/Time Value Ref Range Status   2018 06:22 AM 8.5 (L) 12.0 - 16.0 g/dL Final   07/10/2018 05:58 AM 10.0 (L) 12.0 - 16.0 g/dL Final   2010 12:18 PM 14.0 12.0 - 16.0 g/dL Final     PLATELET   Date/Time Value Ref Range Status   07/10/2018 05:58  135 - 420 K/uL Final     Patient Instructions:   Current Discharge Medication List      START taking these medications    Details   ibuprofen (MOTRIN) 800 mg tablet Take 1 Tab by mouth every eight (8) hours as needed. Qty: 60 Tab, Refills: 0         CONTINUE these medications which have NOT CHANGED    Details   folic acid 133 mcg tablet Take 400 mcg by mouth daily.          STOP taking these medications       raNITIdine (ZANTAC 75) 75 mg tablet Comments:   Reason for Stopping:               Assessment:     Status post: postpartum vaginal delivery   Recovering well  Breastfeeding  Mood Stable      Plan:     Postpartum care discussed including diet, ambulation,actvitiy restrictions, and mood fluctuations. Discharge instructions and questions answered for vaginal delivery.   Follow in 6 wks or prn      Signed By: Jay Dickinson CNM     July 11, 2018

## 2018-07-11 NOTE — LACTATION NOTE
Mother breast fed two other babies. Mother states baby has been nursing well. She is used to nursing a toddler, so reviewed latch, positioning and  feeding patterns/expectations in the first 24 hours. Overall review, general discussion, questions answered. Gave BF information, daily log and resource guide. Offered assistance if needed before discharge.

## 2018-07-11 NOTE — PROGRESS NOTES
Bedside and Verbal shift change report given to CELESTINO Bull RN (oncoming nurse) by [employer] (offgoing nurse). Report included the following information Intake/Output, MAR, Recent Results and Med Rec Status. 0945- vitals stable, mom reporting good feedings for baby. Discussed plan of care for patient and baby for today. Patient states readiness for discharge. 1345- discharge instructions given to patient and  for both herself and baby. Patient states understanding and denies any further questions or problems at this time. [phone]- patient discharged home via wheelchair with baby in car seat carried down stairs by dad.

## 2018-07-11 NOTE — DISCHARGE INSTRUCTIONS
